# Patient Record
Sex: MALE | Race: BLACK OR AFRICAN AMERICAN | NOT HISPANIC OR LATINO | URBAN - METROPOLITAN AREA
[De-identification: names, ages, dates, MRNs, and addresses within clinical notes are randomized per-mention and may not be internally consistent; named-entity substitution may affect disease eponyms.]

---

## 2018-01-10 ENCOUNTER — EMERGENCY (EMERGENCY)
Facility: HOSPITAL | Age: 48
LOS: 1 days | Discharge: HOME | End: 2018-01-10
Attending: EMERGENCY MEDICINE | Admitting: EMERGENCY MEDICINE
Payer: MEDICAID

## 2018-01-10 VITALS
DIASTOLIC BLOOD PRESSURE: 82 MMHG | SYSTOLIC BLOOD PRESSURE: 158 MMHG | OXYGEN SATURATION: 97 % | HEART RATE: 92 BPM | TEMPERATURE: 98 F | RESPIRATION RATE: 22 BRPM

## 2018-01-10 DIAGNOSIS — F17.200 NICOTINE DEPENDENCE, UNSPECIFIED, UNCOMPLICATED: ICD-10-CM

## 2018-01-10 DIAGNOSIS — I10 ESSENTIAL (PRIMARY) HYPERTENSION: ICD-10-CM

## 2018-01-10 DIAGNOSIS — J45.909 UNSPECIFIED ASTHMA, UNCOMPLICATED: ICD-10-CM

## 2018-01-10 DIAGNOSIS — S51.852A OPEN BITE OF LEFT FOREARM, INITIAL ENCOUNTER: ICD-10-CM
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
## 2018-01-10 DIAGNOSIS — S50.812A ABRASION OF LEFT FOREARM, INITIAL ENCOUNTER: ICD-10-CM
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
## 2018-01-10 DIAGNOSIS — Z23 ENCOUNTER FOR IMMUNIZATION: ICD-10-CM

## 2018-01-10 DIAGNOSIS — Z91.018 ALLERGY TO OTHER FOODS: ICD-10-CM
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
## 2018-01-10 LAB — HIV 1 & 2 AB SERPL IA.RAPID: SIGNIFICANT CHANGE UP

## 2018-01-10 PROCEDURE — 99284 EMERGENCY DEPT VISIT MOD MDM: CPT | Mod: 25

## 2018-01-10 RX ORDER — TETANUS TOXOID, REDUCED DIPHTHERIA TOXOID AND ACELLULAR PERTUSSIS VACCINE, ADSORBED 5; 2.5; 8; 8; 2.5 [IU]/.5ML; [IU]/.5ML; UG/.5ML; UG/.5ML; UG/.5ML
0.5 SUSPENSION INTRAMUSCULAR ONCE
Qty: 0 | Refills: 0 | Status: COMPLETED | OUTPATIENT
Start: 2018-01-10 | End: 2018-01-10

## 2018-01-10 RX ORDER — MUPIROCIN 20 MG/G
1 OINTMENT TOPICAL
Qty: 22 | Refills: 1 | OUTPATIENT
Start: 2018-01-10 | End: 2018-01-23

## 2018-01-10 RX ORDER — L.ACIDOPH/B.ANIMALIS/B.LONGUM 15B CELL
1 CAPSULE ORAL
Qty: 30 | Refills: 0 | OUTPATIENT
Start: 2018-01-10 | End: 2018-02-08

## 2018-01-10 RX ORDER — IBUPROFEN 200 MG
1 TABLET ORAL
Qty: 30 | Refills: 0 | OUTPATIENT
Start: 2018-01-10

## 2018-01-10 RX ORDER — IBUPROFEN 200 MG
600 TABLET ORAL ONCE
Qty: 0 | Refills: 0 | Status: COMPLETED | OUTPATIENT
Start: 2018-01-10 | End: 2018-01-10

## 2018-01-10 RX ORDER — ACETAMINOPHEN 500 MG
975 TABLET ORAL ONCE
Qty: 0 | Refills: 0 | Status: COMPLETED | OUTPATIENT
Start: 2018-01-10 | End: 2018-01-10

## 2018-01-10 RX ADMIN — Medication 1 TABLET(S): at 01:53

## 2018-01-10 RX ADMIN — Medication 600 MILLIGRAM(S): at 01:53

## 2018-01-10 RX ADMIN — Medication 975 MILLIGRAM(S): at 01:53

## 2018-01-10 RX ADMIN — TETANUS TOXOID, REDUCED DIPHTHERIA TOXOID AND ACELLULAR PERTUSSIS VACCINE, ADSORBED 0.5 MILLILITER(S): 5; 2.5; 8; 8; 2.5 SUSPENSION INTRAMUSCULAR at 02:08

## 2018-01-10 NOTE — ED PROVIDER NOTE - OBJECTIVE STATEMENT
47 M here with dog bite to his left forearm a few hrs ago. It was his friends dog- an aggressive dog who is UTD on his shots. No other complaints. No other injuries.  Didn't take anything for it.

## 2018-01-10 NOTE — ED ADULT TRIAGE NOTE - CHIEF COMPLAINT QUOTE
sustained dog bite by known by 1 hr ago sustained dog bite by known by 1 hr ago. bite to left forearm.  unknown tetanus

## 2018-01-10 NOTE — ED PROVIDER NOTE - SKIN, MLM
~ 6cm jagged deep abrasion from dog bite, slight swelling + surrounding larger area of skin abrasion ~ 2cm

## 2022-01-06 ENCOUNTER — TRANSCRIPTION ENCOUNTER (OUTPATIENT)
Age: 52
End: 2022-01-06

## 2022-03-17 PROBLEM — J45.909 UNSPECIFIED ASTHMA, UNCOMPLICATED: Chronic | Status: ACTIVE | Noted: 2018-01-10

## 2022-03-17 PROBLEM — I10 ESSENTIAL (PRIMARY) HYPERTENSION: Chronic | Status: ACTIVE | Noted: 2018-01-10

## 2022-03-23 PROBLEM — Z00.00 ENCOUNTER FOR PREVENTIVE HEALTH EXAMINATION: Status: ACTIVE | Noted: 2022-03-23

## 2022-03-24 ENCOUNTER — APPOINTMENT (OUTPATIENT)
Dept: NEUROSURGERY | Facility: CLINIC | Age: 52
End: 2022-03-24

## 2022-04-21 ENCOUNTER — APPOINTMENT (OUTPATIENT)
Dept: NEUROSURGERY | Facility: CLINIC | Age: 52
End: 2022-04-21
Payer: MEDICAID

## 2022-04-21 VITALS
HEIGHT: 76 IN | RESPIRATION RATE: 16 BRPM | WEIGHT: 279 LBS | BODY MASS INDEX: 33.97 KG/M2 | HEART RATE: 102 BPM | OXYGEN SATURATION: 97 %

## 2022-04-21 DIAGNOSIS — M54.16 RADICULOPATHY, LUMBAR REGION: ICD-10-CM

## 2022-04-21 DIAGNOSIS — M48.061 SPINAL STENOSIS, LUMBAR REGION WITHOUT NEUROGENIC CLAUDICATION: ICD-10-CM

## 2022-04-21 PROCEDURE — 99203 OFFICE O/P NEW LOW 30 MIN: CPT

## 2022-04-22 PROBLEM — M48.061 LUMBAR STENOSIS: Status: ACTIVE | Noted: 2022-04-21

## 2024-07-26 ENCOUNTER — EMERGENCY (EMERGENCY)
Facility: HOSPITAL | Age: 54
LOS: 1 days | Discharge: HOME | End: 2024-07-26
Attending: EMERGENCY MEDICINE | Admitting: EMERGENCY MEDICINE
Payer: MEDICAID

## 2024-07-26 VITALS
SYSTOLIC BLOOD PRESSURE: 177 MMHG | DIASTOLIC BLOOD PRESSURE: 101 MMHG | HEART RATE: 87 BPM | OXYGEN SATURATION: 98 % | TEMPERATURE: 98 F | RESPIRATION RATE: 16 BRPM

## 2024-07-26 VITALS
OXYGEN SATURATION: 97 % | DIASTOLIC BLOOD PRESSURE: 102 MMHG | SYSTOLIC BLOOD PRESSURE: 201 MMHG | TEMPERATURE: 97 F | HEART RATE: 104 BPM | RESPIRATION RATE: 17 BRPM | WEIGHT: 250 LBS | HEIGHT: 76 IN

## 2024-07-26 DIAGNOSIS — Z91.010 ALLERGY TO PEANUTS: ICD-10-CM

## 2024-07-26 DIAGNOSIS — G89.29 OTHER CHRONIC PAIN: ICD-10-CM

## 2024-07-26 DIAGNOSIS — Z91.013 ALLERGY TO SEAFOOD: ICD-10-CM

## 2024-07-26 DIAGNOSIS — M54.9 DORSALGIA, UNSPECIFIED: ICD-10-CM

## 2024-07-26 DIAGNOSIS — I10 ESSENTIAL (PRIMARY) HYPERTENSION: ICD-10-CM

## 2024-07-26 PROCEDURE — 99284 EMERGENCY DEPT VISIT MOD MDM: CPT

## 2024-07-26 RX ORDER — LIDOCAINE HCL 28 MG/G
1 GEL TOPICAL
Qty: 4 | Refills: 0
Start: 2024-07-26 | End: 2024-07-29

## 2024-07-26 RX ORDER — KETOROLAC TROMETHAMINE 30 MG/ML
30 INJECTION, SOLUTION INTRAMUSCULAR ONCE
Refills: 0 | Status: DISCONTINUED | OUTPATIENT
Start: 2024-07-26 | End: 2024-07-26

## 2024-07-26 RX ORDER — LIDOCAINE HCL 28 MG/G
2 GEL TOPICAL ONCE
Refills: 0 | Status: COMPLETED | OUTPATIENT
Start: 2024-07-26 | End: 2024-07-26

## 2024-07-26 RX ORDER — METHOCARBAMOL 500 MG
2 TABLET ORAL
Qty: 24 | Refills: 0
Start: 2024-07-26 | End: 2024-07-29

## 2024-07-26 RX ORDER — NAPROXEN SODIUM 550 MG
1 TABLET ORAL
Qty: 12 | Refills: 0
Start: 2024-07-26 | End: 2024-07-29

## 2024-07-26 RX ORDER — METHOCARBAMOL 500 MG
1500 TABLET ORAL ONCE
Refills: 0 | Status: COMPLETED | OUTPATIENT
Start: 2024-07-26 | End: 2024-07-26

## 2024-07-26 RX ADMIN — LIDOCAINE HCL 2 PATCH: 28 GEL TOPICAL at 14:57

## 2024-07-26 RX ADMIN — KETOROLAC TROMETHAMINE 30 MILLIGRAM(S): 30 INJECTION, SOLUTION INTRAMUSCULAR at 14:56

## 2024-07-26 RX ADMIN — Medication 1500 MILLIGRAM(S): at 14:56
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
## 2024-07-26 NOTE — ED PROVIDER NOTE - CLINICAL SUMMARY MEDICAL DECISION MAKING FREE TEXT BOX
53 yo M presented to the eD for acute on chronic back pain. No midline tenderness, bladder or bowel incontinence or retention. Will give robaxin, toradol and lidocaine patch for pain and dc home with medications. Pt is hypertensive in the ED but asymptomatic- no HA, CP, sob. Offered antihypertensives but patient refused.

## 2024-07-26 NOTE — ED PROVIDER NOTE - PATIENT PORTAL LINK FT
You can access the FollowMyHealth Patient Portal offered by [facility] by registering at the following website: [email or web address]. By joining [facility] FollowMyHealth portal, you will also be able to view your health information using other applications (apps) compatible with our system.

## 2024-07-26 NOTE — ED PROVIDER NOTE - DISPOSITION TYPE
Is This A New Presentation, Or A Follow-Up?: Skin Lesion
How Severe Is Your Skin Lesion?: moderate
Has Your Skin Lesion Been Treated?: not been treated
Additional History: Patient voices concern on asymptomatic place on L side of forehead.
DISCHARGE

## 2024-07-26 NOTE — ED PROVIDER NOTE - PHYSICAL EXAMINATION
Const: No apparent distress  Eyes: PERRL, no conjunctival injection  HENT:  Neck supple without meningismus   CV: RRR, Warm, well-perfused extremities  RESP: CTA B/L, no tachypnea   GI: soft, non-tender, non-distended  MSK: No gross deformities appreciated, no c-spine, t-spine or L spine tenderness or stepoffs. pt has lumbar paraspinal tenderness b/l  Skin: Warm, dry. No rashes  Neuro: Alert,

## 2024-07-26 NOTE — ED ADULT NURSE NOTE - NSFALLUNIVINTERV_ED_ALL_ED
Bed/Stretcher in lowest position, wheels locked, appropriate side rails in place/Call bell, personal items and telephone in reach/Instruct patient to call for assistance before getting out of bed/chair/stretcher/Non-slip footwear applied when patient is off stretcher/[place] to call system/Physically safe environment - no spills, clutter or unnecessary equipment/Purposeful proactive rounding/Room/bathroom lighting operational, light cord in reach

## 2025-03-05 VITALS
RESPIRATION RATE: 18 BRPM | HEART RATE: 67 BPM | TEMPERATURE: 98 F | DIASTOLIC BLOOD PRESSURE: 84 MMHG | OXYGEN SATURATION: 97 % | SYSTOLIC BLOOD PRESSURE: 163 MMHG

## 2025-03-05 NOTE — H&P ADULT - NSHPLABSRESULTS_GEN_ALL_CORE
15.9   5.96  )-----------( 190      ( 10 Mar 2025 12:52 )             46.5       03-10    143  |  109[H]  |  13  ----------------------------<  96  3.8   |  22  |  0.98    Ca    9.2      10 Mar 2025 12:52  Mg     2.0     03-10    TPro  7.4  /  Alb  4.1  /  TBili  0.6  /  DBili  x   /  AST  22  /  ALT  19  /  AlkPhos  74  03-10    PT/INR - ( 10 Mar 2025 12:52 )   PT: 11.0 sec;   INR: 0.94       PTT - ( 10 Mar 2025 12:52 )  PTT:32.8 sec    CARDIAC MARKERS ( 10 Mar 2025 12:52 )  x     / x     / x     / x     / 6.6 ng/mL    Urinalysis Basic - ( 10 Mar 2025 12:52 )    Color: x / Appearance: x / SG: x / pH: x  Gluc: 96 mg/dL / Ketone: x  / Bili: x / Urobili: x   Blood: x / Protein: x / Nitrite: x   Leuk Esterase: x / RBC: x / WBC x   Sq Epi: x / Non Sq Epi: x / Bacteria: x

## 2025-03-05 NOTE — H&P ADULT - NSICDXPASTMEDICALHX_GEN_ALL_CORE_FT
PAST MEDICAL HISTORY:  Asthma     Hyperlipidemia     Hypertension     Obesity      PAST MEDICAL HISTORY:  Asthma     History of degenerative disc disease     Hyperlipidemia     Hypertension     Obesity     Sciatica

## 2025-03-05 NOTE — H&P ADULT - HISTORY OF PRESENT ILLNESS
Cardiologist: Dr. Barnes  Pharmacy:   Escort:    55 yo M with PMH of HTN, HLD, asthma, DDD, obesity class 2 who presented to outpatient cardiologist with episodes of sharp chest pain, lasting for seconds to minutes at a time. He has not noticed significant triggers and it's not consistently associated with physical activity. His exertional tolerance is very limited by degenerative discs and spinal radiculopathy, so he doesn't exercise regularly. Patient denies SOB, KLEIN, orthopnea, abdominal pain, N/V/D, dizziness, lightheadedness, BRBPR, melena, fever or sick contacts.     Myocardial Perfusion Stress Test (1/20/25): Overall function is abnormal with regional wall motion abnormalities. Myocardial perfusion abnormal. Reversible moderate defect in lateral, inferior and apical regions. Stress EF 60-65%.   TTE (1/10/25): EF 50-55%, RV not well visualized, valves not well seen    In light of patient's risk factors, abnormal stress test and CCS anginal class III symptoms, patient presents to [facility] for cardiac catheterization with possible intervention if clinically indicated.    Cardiologist: Dr. Barnes  Pharmacy: [facility] [address], [place]  Escort: Zack Giraldo (brother)    55 yo M with PMH of HTN, HLD, asthma, degenerative disc disease, obesity class 2, sciatica who presented to outpatient cardiologist with episodes of sharp chest pain, lasting for seconds to minutes at a time. He has not noticed significant triggers and it's not consistently associated with physical activity. His exertional tolerance is very limited by degenerative discs and spinal radiculopathy, so he doesn't exercise regularly and walks with a cane. Patient reports nearly daily BRBPR due to hemorrhoids. Stating there are a few drops of blood in the toilet after a BM along with blood on toilet paper after he wipes. He has never had a procedure on his hemorrhoids. Reports current back pain with radiation to legs b/l. Patient denies SOB, wheezing, KLEIN, orthopnea, abdominal pain, N/V/D, dizziness, lightheadedness, melena, fever or sick contacts.     Myocardial Perfusion Stress Test (1/20/25): Overall function is abnormal with RWMA. Myocardial perfusion abnormal. Reversible moderate defect in lateral, inferior and apical regions. Stress EF 60-65%. TID seen.  TTE (1/10/25): EF 50-55%, RV not well visualized, valves not well seen    In light of patient's risk factors, abnormal stress test and CCS anginal class III symptoms, patient presents to [facility] for cardiac catheterization with possible intervention if clinically indicated.

## 2025-03-05 NOTE — H&P ADULT - ASSESSMENT
EKG: 			  ASA 	  Mallampati class:     -iodine (Hives)  Nuts (Anaphylaxis)  shellfish (Anaphylaxis)  penicillin G potassium (Unknown)    -H/H = 15.9/46.5  . Pt denies BRBPR, hematuria, hematochezia, melena. Pt endorses compliance w/ home ________, stating last dose was ______. Pt loaded w/ ASA ___ mg x1 and Plavix ___ mg x1  -BUN/Cr = 13/0.98  . EF ___. Euvolemic on exam. IV NS @ ___ cc bolus followed by ___ cc/hr x ___ hrs started pre procedure    Sedation Plan:   Moderate  Patient Is Suitable Candidate For Sedation?     Yes    Risks & benefits of procedure and alternative therapy have been explained to the patient including but not limited to: allergic reaction, bleeding with possible need for blood transfusion, infection, renal and vascular compromise, limb damage, arrhythmia, stroke, vessel dissection/perforation, myocardial infarction, and emergent CABG. Informed consent obtained at bedside and included in chart. 53 yo M with PMH of HTN, HLD, asthma, degenerative disc disease, obesity class 2, sciatica who presented to outpatient cardiologist with episodes of sharp chest pain, lasting for seconds to minutes at a time. In light of patient's risk factors, abnormal stress test and CCS anginal class III symptoms, patient presents to [facility] for cardiac catheterization with possible intervention if clinically indicated.     EKG: NSR 66bpm, TWI III, aVF, V4-V6			  ASA: III 	  Mallampati class: III    -iodine (Hives)  Nuts (Anaphylaxis)  shellfish (Anaphylaxis)  penicillin G potassium (Unknown)    -H/H = 15.9/46.5  . Pt denies hematuria, hematochezia, melena. Reports nearly daily BRBPR due to hemorrhoids. Stating there are a few drops of blood in the toilet after a BM along with blood on toilet paper after he wipes. Pt endorses compliance w/ home ASA 81mg, stating last dose was today, so he was not loaded w/ ASA. Pt w/ TID on stress, per discussion with Dr. Oro, proceed with Plavix 600 mg x1  -BUN/Cr = 13/0.98  . EF 50-55%. Euvolemic on exam. IV NS @ 250 cc bolus followed by 75 cc/hr x 2 hrs started pre procedure    *Of note, outpatient records indicate patient has allergy to iodine however pt denied known allergy. 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 Oro, will premedicate with Solucortef 200mg IVP with Benadryl 50mg IVP on standby to cath lab. Additionally, patient reports taking all inhalers this morning, however he was wheezing on exam. Denies feeling SOB/wheezing, however, will administer DuoNeb.    Sedation Plan:   Moderate  Patient Is Suitable Candidate For Sedation?     Yes    Risks & benefits of procedure and alternative therapy have been explained to the patient including but not limited to: allergic reaction, bleeding with possible need for blood transfusion, infection, renal and vascular compromise, limb damage, arrhythmia, stroke, vessel dissection/perforation, myocardial infarction, and emergent CABG. Informed consent obtained at bedside and included in chart. 53 yo M with PMH of HTN, HLD, asthma, degenerative disc disease, obesity class 2, sciatica who presented to outpatient cardiologist with episodes of sharp chest pain, lasting for seconds to minutes at a time. In light of patient's risk factors, abnormal stress test and CCS anginal class III symptoms, patient presents to [facility] for cardiac catheterization with possible intervention if clinically indicated.     EKG: NSR 66bpm, TWI III, aVF, V4-V6			  ASA: III 	  Mallampati class: III    -iodine (Hives)  Nuts (Anaphylaxis)  shellfish (Anaphylaxis)  penicillin G potassium (Unknown)    -H/H = 15.9/46.5  . Pt denies hematuria, hematochezia, melena. Reports nearly daily BRBPR due to hemorrhoids. Stating there are a few drops of blood in the toilet after a BM along with blood on toilet paper after he wipes. Pt endorses compliance w/ home ASA 81mg, stating last dose was today, so he was not loaded w/ ASA. Pt w/ TID on stress, per discussion with Dr. Oro, proceed with Plavix 600 mg x1  -BUN/Cr = 13/0.98  . EF 50-55%. Euvolemic on exam. IV NS @ 250 cc bolus followed by 75 cc/hr x 2 hrs started pre procedure    *Of note, outpatient records indicate patient has allergy to iodine however pt denied known allergy. 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 Oro, will premedicate with Solucortef 200mg IVP and Benadryl 50mg IVP on standby to cath lab. Additionally, patient reports taking all inhalers this morning, however he was wheezing on exam. Denies feeling SOB/wheezing, however, will administer DuoNeb. Patient was unsure of his daily medications, med rec is a combined list from pharmacy and discussion with patient    Sedation Plan:   Moderate  Patient Is Suitable Candidate For Sedation?     Yes    Risks & benefits of procedure and alternative therapy have been explained to the patient including but not limited to: allergic reaction, bleeding with possible need for blood transfusion, infection, renal and vascular compromise, limb damage, arrhythmia, stroke, vessel dissection/perforation, myocardial infarction, and emergent CABG. Informed consent obtained at bedside and included in chart.

## 2025-03-05 NOTE — H&P ADULT - NSICDXFAMILYHX_GEN_ALL_CORE_FT
FAMILY HISTORY:  Father  Still living? Unknown  FH: hyperlipidemia, Age at diagnosis: Age Unknown    Mother  Still living? Unknown  FHx: hypertension, Age at diagnosis: Age Unknown

## 2025-03-10 ENCOUNTER — OUTPATIENT (OUTPATIENT)
Dept: OUTPATIENT SERVICES | Facility: HOSPITAL | Age: 55
LOS: 1 days | Discharge: HOME | End: 2025-03-10
Payer: COMMERCIAL

## 2025-03-10 LAB
A1C WITH ESTIMATED AVERAGE GLUCOSE RESULT: 5.2 % — SIGNIFICANT CHANGE UP (ref 4–5.6)
ALBUMIN SERPL ELPH-MCNC: 4.1 G/DL — SIGNIFICANT CHANGE UP (ref 3.3–5)
ALP SERPL-CCNC: 74 U/L — SIGNIFICANT CHANGE UP (ref 40–120)
ALT FLD-CCNC: 19 U/L — SIGNIFICANT CHANGE UP (ref 10–45)
ANION GAP SERPL CALC-SCNC: 12 MMOL/L — SIGNIFICANT CHANGE UP (ref 5–17)
APTT BLD: 32.8 SEC — SIGNIFICANT CHANGE UP (ref 24.5–35.6)
AST SERPL-CCNC: 22 U/L — SIGNIFICANT CHANGE UP (ref 10–40)
BASOPHILS # BLD AUTO: 0.05 K/UL — SIGNIFICANT CHANGE UP (ref 0–0.2)
BASOPHILS NFR BLD AUTO: 0.8 % — SIGNIFICANT CHANGE UP (ref 0–2)
BILIRUB SERPL-MCNC: 0.6 MG/DL — SIGNIFICANT CHANGE UP (ref 0.2–1.2)
BUN SERPL-MCNC: 13 MG/DL — SIGNIFICANT CHANGE UP (ref 7–23)
CALCIUM SERPL-MCNC: 9.2 MG/DL — SIGNIFICANT CHANGE UP (ref 8.4–10.5)
CHLORIDE SERPL-SCNC: 109 MMOL/L — HIGH (ref 96–108)
CHOLEST SERPL-MCNC: 127 MG/DL — SIGNIFICANT CHANGE UP
CK MB CFR SERPL CALC: 6.6 NG/ML — SIGNIFICANT CHANGE UP (ref 0–6.7)
CK SERPL-CCNC: 719 U/L — HIGH (ref 30–200)
CO2 SERPL-SCNC: 22 MMOL/L — SIGNIFICANT CHANGE UP (ref 22–31)
CREAT SERPL-MCNC: 0.98 MG/DL — SIGNIFICANT CHANGE UP (ref 0.5–1.3)
EGFR: 90 ML/MIN/1.73M2 — SIGNIFICANT CHANGE UP
EGFR: 90 ML/MIN/1.73M2 — SIGNIFICANT CHANGE UP
EOSINOPHIL # BLD AUTO: 0.18 K/UL — SIGNIFICANT CHANGE UP (ref 0–0.5)
EOSINOPHIL NFR BLD AUTO: 3 % — SIGNIFICANT CHANGE UP (ref 0–6)
ESTIMATED AVERAGE GLUCOSE: 103 MG/DL — SIGNIFICANT CHANGE UP (ref 68–114)
GLUCOSE SERPL-MCNC: 96 MG/DL — SIGNIFICANT CHANGE UP (ref 70–99)
HCT VFR BLD CALC: 46.5 % — SIGNIFICANT CHANGE UP (ref 39–50)
HDLC SERPL-MCNC: 51 MG/DL — SIGNIFICANT CHANGE UP
HGB BLD-MCNC: 15.9 G/DL — SIGNIFICANT CHANGE UP (ref 13–17)
IMM GRANULOCYTES NFR BLD AUTO: 0.2 % — SIGNIFICANT CHANGE UP (ref 0–0.9)
INR BLD: 0.94 — SIGNIFICANT CHANGE UP (ref 0.85–1.16)
LIPID PNL WITH DIRECT LDL SERPL: 59 MG/DL — SIGNIFICANT CHANGE UP
LYMPHOCYTES # BLD AUTO: 2.22 K/UL — SIGNIFICANT CHANGE UP (ref 1–3.3)
LYMPHOCYTES # BLD AUTO: 37.2 % — SIGNIFICANT CHANGE UP (ref 13–44)
MAGNESIUM SERPL-MCNC: 2 MG/DL — SIGNIFICANT CHANGE UP (ref 1.6–2.6)
MCHC RBC-ENTMCNC: 29 PG — SIGNIFICANT CHANGE UP (ref 27–34)
MCHC RBC-ENTMCNC: 34.2 G/DL — SIGNIFICANT CHANGE UP (ref 32–36)
MCV RBC AUTO: 84.7 FL — SIGNIFICANT CHANGE UP (ref 80–100)
MONOCYTES # BLD AUTO: 0.47 K/UL — SIGNIFICANT CHANGE UP (ref 0–0.9)
MONOCYTES NFR BLD AUTO: 7.9 % — SIGNIFICANT CHANGE UP (ref 2–14)
NEUTROPHILS # BLD AUTO: 3.03 K/UL — SIGNIFICANT CHANGE UP (ref 1.8–7.4)
NEUTROPHILS NFR BLD AUTO: 50.9 % — SIGNIFICANT CHANGE UP (ref 43–77)
NON HDL CHOLESTEROL: 76 MG/DL — SIGNIFICANT CHANGE UP
NRBC BLD AUTO-RTO: 0 /100 WBCS — SIGNIFICANT CHANGE UP (ref 0–0)
PLATELET # BLD AUTO: 190 K/UL — SIGNIFICANT CHANGE UP (ref 150–400)
POTASSIUM SERPL-MCNC: 3.8 MMOL/L — SIGNIFICANT CHANGE UP (ref 3.5–5.3)
POTASSIUM SERPL-SCNC: 3.8 MMOL/L — SIGNIFICANT CHANGE UP (ref 3.5–5.3)
PROT SERPL-MCNC: 7.4 G/DL — SIGNIFICANT CHANGE UP (ref 6–8.3)
PROTHROM AB SERPL-ACNC: 11 SEC — SIGNIFICANT CHANGE UP (ref 9.9–13.4)
RBC # BLD: 5.49 M/UL — SIGNIFICANT CHANGE UP (ref 4.2–5.8)
RBC # FLD: 13.4 % — SIGNIFICANT CHANGE UP (ref 10.3–14.5)
SODIUM SERPL-SCNC: 143 MMOL/L — SIGNIFICANT CHANGE UP (ref 135–145)
TRIGL SERPL-MCNC: 87 MG/DL — SIGNIFICANT CHANGE UP
WBC # BLD: 5.96 K/UL — SIGNIFICANT CHANGE UP (ref 3.8–10.5)
WBC # FLD AUTO: 5.96 K/UL — SIGNIFICANT CHANGE UP (ref 3.8–10.5)

## 2025-03-10 PROCEDURE — 93010 ELECTROCARDIOGRAM REPORT: CPT

## 2025-03-10 RX ORDER — ROSUVASTATIN CALCIUM 20 MG/1
1 TABLET, FILM COATED ORAL
Refills: 0 | DISCHARGE

## 2025-03-10 RX ORDER — ASPIRIN 325 MG
1 TABLET ORAL
Refills: 0 | DISCHARGE

## 2025-03-10 RX ORDER — HYDROCORTISONE 20 MG
200 TABLET ORAL ONCE
Refills: 0 | Status: COMPLETED | OUTPATIENT
Start: 2025-03-10 | End: 2025-03-10

## 2025-03-10 RX ORDER — CLOPIDOGREL BISULFATE 75 MG/1
600 TABLET, FILM COATED ORAL ONCE
Refills: 0 | Status: COMPLETED | OUTPATIENT
Start: 2025-03-10 | End: 2025-03-10

## 2025-03-10 RX ORDER — ALBUTEROL SULFATE 2.5 MG/3ML
2 VIAL, NEBULIZER (ML) INHALATION
Refills: 0 | DISCHARGE

## 2025-03-10 RX ORDER — CHLORTHALIDONE 25 MG/1
1 TABLET ORAL
Refills: 0 | DISCHARGE

## 2025-03-10 RX ORDER — IPRATROPIUM BROMIDE AND ALBUTEROL SULFATE .5; 2.5 MG/3ML; MG/3ML
3 SOLUTION RESPIRATORY (INHALATION) EVERY 6 HOURS
Refills: 0 | Status: DISCONTINUED | OUTPATIENT
Start: 2025-03-10 | End: 2025-03-25

## 2025-03-10 RX ORDER — AMLODIPINE AND BENAZEPRIL HYDROCHLORIDE 5; 20 MG/1; MG/1
1 CAPSULE ORAL
Refills: 0 | DISCHARGE

## 2025-03-10 RX ORDER — DIPHENHYDRAMINE HCL 12.5MG/5ML
50 ELIXIR ORAL ONCE
Refills: 0 | Status: COMPLETED | OUTPATIENT
Start: 2025-03-10 | End: 2025-03-10

## 2025-03-10 RX ORDER — ASPIRIN 325 MG
81 TABLET ORAL ONCE
Refills: 0 | Status: COMPLETED | OUTPATIENT
Start: 2025-03-10 | End: 2025-03-10

## 2025-03-10 RX ORDER — METOPROLOL SUCCINATE 50 MG/1
1 TABLET, EXTENDED RELEASE ORAL
Refills: 0 | DISCHARGE

## 2025-03-10 RX ADMIN — Medication 500 MILLILITER(S): at 13:40

## 2025-03-10 RX ADMIN — Medication 40 MILLIEQUIVALENT(S): at 14:04

## 2025-03-10 RX ADMIN — Medication 200 MILLIGRAM(S): at 13:40

## 2025-03-10 RX ADMIN — CLOPIDOGREL BISULFATE 600 MILLIGRAM(S): 75 TABLET, FILM COATED ORAL at 13:39

## 2025-03-10 RX ADMIN — Medication 75 MILLILITER(S): at 13:56

## 2025-03-10 RX ADMIN — Medication 250 MILLILITER(S): at 18:09

## 2025-03-10 RX ADMIN — Medication 50 MILLIGRAM(S): at 13:58

## 2025-03-10 RX ADMIN — IPRATROPIUM BROMIDE AND ALBUTEROL SULFATE 3 MILLILITER(S): .5; 2.5 SOLUTION RESPIRATORY (INHALATION) at 13:40

## 2025-03-10 NOTE — PROGRESS NOTE ADULT - SUBJECTIVE AND OBJECTIVE BOX
Interventional Cardiology PA SDA Discharge Note    Patient without complaints. Ambulated and voided without difficulties    Afebrile, VSS    Ext:    		Right Radial :  No  hematoma,  No   bleeding, dressing; C/D/I      Pulses:    intact RAD to baseline     A/P:  55 yo M with PMH of HTN, HLD, asthma, degenerative disc disease, obesity class 2, sciatica who presented for cardiac cath in light of patient's risk factors, CCS Angina Class III Symptoms and abnormal NST. Patient s/p diagnostic cardiac cath 3/10/25 revealing non obstructive CAD, LVEDP 14 mm Hg.       1.	Stable for discharge as per attending Dr. Oro after bed rest, pt voids, right wrist  and 30 minutes of ambulation.  2.	Follow-up with PMD/Cardiologist Dr. Barnes in 1-2 weeks  3.	Discharged forms signed and copies in chart

## 2025-03-11 PROCEDURE — 82550 ASSAY OF CK (CPK): CPT

## 2025-03-11 PROCEDURE — 36415 COLL VENOUS BLD VENIPUNCTURE: CPT

## 2025-03-11 PROCEDURE — 85025 COMPLETE CBC W/AUTO DIFF WBC: CPT

## 2025-03-11 PROCEDURE — 94640 AIRWAY INHALATION TREATMENT: CPT

## 2025-03-11 PROCEDURE — 83735 ASSAY OF MAGNESIUM: CPT

## 2025-03-11 PROCEDURE — 82553 CREATINE MB FRACTION: CPT

## 2025-03-11 PROCEDURE — 85610 PROTHROMBIN TIME: CPT

## 2025-03-11 PROCEDURE — 83036 HEMOGLOBIN GLYCOSYLATED A1C: CPT

## 2025-03-11 PROCEDURE — 93458 L HRT ARTERY/VENTRICLE ANGIO: CPT

## 2025-03-11 PROCEDURE — 80061 LIPID PANEL: CPT

## 2025-03-11 PROCEDURE — C1894: CPT

## 2025-03-11 PROCEDURE — 93005 ELECTROCARDIOGRAM TRACING: CPT

## 2025-03-11 PROCEDURE — C1887: CPT

## 2025-03-11 PROCEDURE — 85730 THROMBOPLASTIN TIME PARTIAL: CPT

## 2025-03-11 PROCEDURE — 80053 COMPREHEN METABOLIC PANEL: CPT

## 2025-03-11 PROCEDURE — C1769: CPT

## 2025-03-12 DIAGNOSIS — I25.110 ATHEROSCLEROTIC HEART DISEASE OF NATIVE CORONARY ARTERY WITH UNSTABLE ANGINA PECTORIS: ICD-10-CM

## 2025-03-12 DIAGNOSIS — R94.39 ABNORMAL RESULT OF OTHER CARDIOVASCULAR FUNCTION STUDY: ICD-10-CM
